# Patient Record
Sex: MALE | Race: WHITE | NOT HISPANIC OR LATINO | Employment: OTHER | URBAN - METROPOLITAN AREA
[De-identification: names, ages, dates, MRNs, and addresses within clinical notes are randomized per-mention and may not be internally consistent; named-entity substitution may affect disease eponyms.]

---

## 2018-01-28 ENCOUNTER — OFFICE VISIT (OUTPATIENT)
Dept: URGENT CARE | Facility: CLINIC | Age: 68
End: 2018-01-28
Payer: MEDICARE

## 2018-01-28 VITALS
SYSTOLIC BLOOD PRESSURE: 141 MMHG | TEMPERATURE: 97.3 F | OXYGEN SATURATION: 98 % | HEART RATE: 65 BPM | BODY MASS INDEX: 37.37 KG/M2 | HEIGHT: 70 IN | DIASTOLIC BLOOD PRESSURE: 77 MMHG | WEIGHT: 261 LBS

## 2018-01-28 DIAGNOSIS — H61.23 BILATERAL IMPACTED CERUMEN: Primary | ICD-10-CM

## 2018-01-28 PROCEDURE — 99203 OFFICE O/P NEW LOW 30 MIN: CPT | Performed by: FAMILY MEDICINE

## 2018-01-28 PROCEDURE — G0463 HOSPITAL OUTPT CLINIC VISIT: HCPCS | Performed by: FAMILY MEDICINE

## 2018-01-28 PROCEDURE — 69210 REMOVE IMPACTED EAR WAX UNI: CPT | Performed by: FAMILY MEDICINE

## 2018-01-28 NOTE — PROGRESS NOTES
[facility] [address]     Patient Information: Archana Hernandez  MRN: [id number] : 1950  Encounter: [id number]    HPI:  Patient presents with complaints of decreased hearing times the last 3-4 days  He complains of a block sensation both ears  He relates that he did get his ears irrigated once a year while he was working  He has been retired for the past 2 years  He states he has been using Q-tips in his ears  He denies any severe ear pain but admits to decreased hearing  Past medical history is otherwise significant for hypertension CVA and psoriasis  Subjective:   Review of Systems   Constitutional: Negative  HENT: Positive for hearing loss  Negative for congestion, ear discharge, ear pain, rhinorrhea, sinus pain, sinus pressure, sore throat, tinnitus, trouble swallowing and voice change  Eyes: Negative  Respiratory: Negative  Cardiovascular: Negative  Gastrointestinal: Negative  Genitourinary: Negative  Musculoskeletal: Negative  Skin: Negative  Neurological: Negative  Hematological: Negative  Objective:  /77 (BP Location: Left arm, Patient Position: Sitting, Cuff Size: Large)   Pulse 65   Temp (!) 97 3 °F (36 3 °C) (Tympanic)   Ht 5' 10" (1 778 m)   Wt 118 kg (261 lb)   SpO2 98%   BMI 37 45 kg/m²   Physical Exam   Constitutional: He is oriented to person, place, and time  He appears well-developed  HENT:   Head: Normocephalic  Mouth/Throat: Oropharynx is clear and moist    E ACs bilaterally occluded with cerumen  TMs not visualized  After ear lavage left EAC is partially cleaned with visualization of the TM  Right EAC is  irrigated with complete removal of all wax  Right TM is normal   Eyes: EOM are normal  Pupils are equal, round, and reactive to light  Neck: Normal range of motion  Neck supple  No thyromegaly present     Cardiovascular: Normal rate, regular rhythm, normal heart sounds and intact distal pulses  Pulmonary/Chest: Effort normal and breath sounds normal    Abdominal: Soft  Bowel sounds are normal  There is no tenderness  Musculoskeletal: Normal range of motion  Neurological: He is alert and oriented to person, place, and time  He has normal reflexes  Skin: Skin is warm and dry  Psychiatric: He has a normal mood and affect  Vitals reviewed  Ear cerumen removal  Date/Time: 1/28/2018 11:55 AM  Performed by: Debora Rojo  Authorized by: Debora Rojo     Patient location:  Clinic  Other Assisting Provider: No    Consent:     Consent obtained:  Verbal    Consent given by:  Patient    Risks discussed:  Incomplete removal and TM perforation  Procedure details:     Location:  L ear and R ear    Procedure type: curette      Approach:  External  Post-procedure details:     Complication:  None    Patient tolerance of procedure: Tolerated well, no immediate complications  Comments:      Discussed with patient that he should follow up with family doctor for re-evaluation of his left ear since not all wax was removed status post lavage  Assessment:  Diagnoses and all orders for this visit:    Bilateral impacted cerumen    Other orders  -     ATORVASTATIN CALCIUM PO; Take by mouth daily  -     FUROSEMIDE PO; Take by mouth daily  -     Metoprolol Tartrate (LOPRESSOR PO); Take by mouth daily  -     ASPIRIN 81 PO; Take by mouth daily  -     Ear cerumen removal        Plan:  Patient Instructions   Use Debrox in your left ear as directed    Follow-up with your family doctor for re-evaluation of the left ear       Sonu Thomas DO  1/28/2018,11:59 AM    Portions of the record may have been created with voice recognition software   Occasional wrong word or "sound a like" substitutions may have occurred due to the inherent limitations of voice recognition software   Read the chart carefully and recognize, using context, where substitutions have occurred

## 2018-01-28 NOTE — PATIENT INSTRUCTIONS
Use Debrox in your left ear as directed    Follow-up with your family doctor for re-evaluation of the left ear

## 2021-04-13 DIAGNOSIS — Z23 ENCOUNTER FOR IMMUNIZATION: ICD-10-CM
